# Patient Record
Sex: MALE | Race: WHITE | URBAN - METROPOLITAN AREA
[De-identification: names, ages, dates, MRNs, and addresses within clinical notes are randomized per-mention and may not be internally consistent; named-entity substitution may affect disease eponyms.]

---

## 2018-06-11 ENCOUNTER — TELEPHONE (OUTPATIENT)
Dept: CARDIOLOGY CLINIC | Facility: HOSPITAL | Age: 38
End: 2018-06-11

## 2018-06-11 NOTE — TELEPHONE ENCOUNTER
Patient's wife called reporting a rapid loss in weight - down another 7 lbs since discharge. Weight at d'c on 6/6 134 lbs, now 127 lbs.  He is on torsemide 20 mg daily, per wife, this is an increase from 10 mg which he was taking before most recent admissio